# Patient Record
(demographics unavailable — no encounter records)

---

## 2024-11-20 NOTE — HISTORY OF PRESENT ILLNESS
[de-identified] : 11/20/2024: inversion injury at soccer practice yesterday w/ ankle pain. went to . walking in reg shoes. no prior ankle probs. denies pmh. 7th grade - [employer]  [] : no [FreeTextEntry1] : right ankle [de-identified] : compression wrap [de-identified] : [facility] [de-identified] : XR

## 2024-11-20 NOTE — ASSESSMENT
[FreeTextEntry1] : wbat airsport ice/elevate nsaids prn rest from activity until pain resolves f/up 2-3 wks if not resolved